# Patient Record
Sex: FEMALE | Race: WHITE | URBAN - METROPOLITAN AREA
[De-identification: names, ages, dates, MRNs, and addresses within clinical notes are randomized per-mention and may not be internally consistent; named-entity substitution may affect disease eponyms.]

---

## 2018-11-09 ENCOUNTER — HOSPITAL ENCOUNTER (OUTPATIENT)
Dept: CT IMAGING | Age: 68
Discharge: HOME | End: 2018-11-11
Payer: MEDICARE

## 2018-11-09 DIAGNOSIS — R93.89 ABNORMAL CT SCAN: ICD-10-CM

## 2018-11-09 PROCEDURE — A9552 F18 FDG: HCPCS | Performed by: INTERNAL MEDICINE

## 2018-11-09 PROCEDURE — 3430000000 HC RX DIAGNOSTIC RADIOPHARMACEUTICAL: Performed by: INTERNAL MEDICINE

## 2018-11-09 PROCEDURE — 78815 PET IMAGE W/CT SKULL-THIGH: CPT

## 2018-11-09 RX ORDER — FLUDEOXYGLUCOSE F 18 200 MCI/ML
15.9 INJECTION, SOLUTION INTRAVENOUS
Status: COMPLETED | OUTPATIENT
Start: 2018-11-09 | End: 2018-11-09

## 2018-11-09 RX ADMIN — FLUDEOXYGLUCOSE F 18 15.9 MILLICURIE: 200 INJECTION, SOLUTION INTRAVENOUS at 10:18

## 2020-08-25 ENCOUNTER — HOSPITAL ENCOUNTER (OUTPATIENT)
Age: 70
End: 2020-08-25
Payer: MEDICARE

## 2020-08-25 VITALS — BODY MASS INDEX: 25.7 KG/M2

## 2020-08-25 DIAGNOSIS — R06.09: Primary | ICD-10-CM

## 2020-08-25 DIAGNOSIS — R06.02: ICD-10-CM

## 2020-08-25 PROCEDURE — 93017 CV STRESS TEST TRACING ONLY: CPT

## 2020-08-25 PROCEDURE — A9500 TC99M SESTAMIBI: HCPCS

## 2020-08-25 PROCEDURE — A4216 STERILE WATER/SALINE, 10 ML: HCPCS

## 2020-08-25 PROCEDURE — 93306 TTE W/DOPPLER COMPLETE: CPT

## 2020-08-25 PROCEDURE — 78452 HT MUSCLE IMAGE SPECT MULT: CPT

## 2024-03-04 ENCOUNTER — HOSPITAL ENCOUNTER (OUTPATIENT)
Age: 74
Discharge: HOME | End: 2024-03-04
Payer: MEDICARE

## 2024-03-04 DIAGNOSIS — R53.83: Primary | ICD-10-CM

## 2024-03-04 DIAGNOSIS — E55.9: ICD-10-CM

## 2024-03-04 LAB
ALANINE AMINOTRANSFER ALT/SGPT: 17 U/L (ref 13–56)
ALBUMIN SERPL-MCNC: 3.6 G/DL (ref 3.2–5)
ALKALINE PHOSPHATASE: 79 U/L (ref 45–117)
ANION GAP: 5 (ref 5–15)
AST(SGOT): 16 U/L (ref 15–37)
BUN SERPL-MCNC: 12 MG/DL (ref 7–18)
BUN/CREAT RATIO: 14.1 RATIO (ref 10–20)
CALCIUM SERPL-MCNC: 9 MG/DL (ref 8.5–10.1)
CARBON DIOXIDE: 29 MMOL/L (ref 21–32)
CHLORIDE: 106 MMOL/L (ref 98–107)
CRP SERPL-MCNC: < 2.9 MG/L (ref 0–3)
DEPRECATED RDW RBC: 45.5 FL (ref 35.1–43.9)
ERYTHROCYTE [DISTWIDTH] IN BLOOD: 13.2 % (ref 11.6–14.6)
EST GLOM FILT RATE - AFR AMER: 84 ML/MIN (ref 60–?)
GLOBULIN: 3.7 G/DL (ref 2.2–4.2)
GLUCOSE: 87 MG/DL (ref 74–106)
HCT VFR BLD AUTO: 42.7 % (ref 37–47)
HGB BLD-MCNC: 14.1 G/DL (ref 12–15)
MCV RBC: 94.1 FL (ref 81–99)
MEAN CORP HGB CONC: 33 G/DL (ref 32–36)
MEAN PLATELET VOL.: 9.8 FL (ref 6.2–12)
PLATELET # BLD: 308 K/MM3 (ref 150–450)
POTASSIUM: 3.7 MMOL/L (ref 3.5–5.1)
RBC # BLD AUTO: 4.54 M/MM3 (ref 4.2–5.4)
VITAMIN B12: 268 PG/ML (ref 211–911)
VITAMIN D,25 HYDROXY: 43.1 NG/ML
WBC # BLD AUTO: 11.8 K/MM3 (ref 4.4–11)

## 2024-03-04 PROCEDURE — 82607 VITAMIN B-12: CPT

## 2024-03-04 PROCEDURE — 85652 RBC SED RATE AUTOMATED: CPT

## 2024-03-04 PROCEDURE — 80053 COMPREHEN METABOLIC PANEL: CPT

## 2024-03-04 PROCEDURE — 82306 VITAMIN D 25 HYDROXY: CPT

## 2024-03-04 PROCEDURE — 86431 RHEUMATOID FACTOR QUANT: CPT

## 2024-03-04 PROCEDURE — 84443 ASSAY THYROID STIM HORMONE: CPT

## 2024-03-04 PROCEDURE — 36415 COLL VENOUS BLD VENIPUNCTURE: CPT

## 2024-03-04 PROCEDURE — 85027 COMPLETE CBC AUTOMATED: CPT

## 2024-03-04 PROCEDURE — 86140 C-REACTIVE PROTEIN: CPT

## 2024-04-05 ENCOUNTER — HOSPITAL ENCOUNTER (OUTPATIENT)
Dept: HOSPITAL 100 - CVS | Age: 74
Discharge: HOME | End: 2024-04-05
Payer: MEDICARE

## 2024-04-05 DIAGNOSIS — I65.23: Primary | ICD-10-CM

## 2024-04-05 PROCEDURE — 93880 EXTRACRANIAL BILAT STUDY: CPT

## 2024-06-20 ENCOUNTER — APPOINTMENT (OUTPATIENT)
Dept: PRIMARY CARE | Facility: CLINIC | Age: 74
End: 2024-06-20
Payer: MEDICARE

## 2024-06-20 VITALS
BODY MASS INDEX: 23.92 KG/M2 | WEIGHT: 130 LBS | SYSTOLIC BLOOD PRESSURE: 112 MMHG | OXYGEN SATURATION: 93 % | HEIGHT: 62 IN | DIASTOLIC BLOOD PRESSURE: 80 MMHG | TEMPERATURE: 97.4 F | HEART RATE: 88 BPM

## 2024-06-20 DIAGNOSIS — J44.1 COPD EXACERBATION (MULTI): ICD-10-CM

## 2024-06-20 DIAGNOSIS — E53.8 B12 NEUROPATHY (MULTI): ICD-10-CM

## 2024-06-20 DIAGNOSIS — F41.9 ANXIETY AND DEPRESSION: ICD-10-CM

## 2024-06-20 DIAGNOSIS — F32.A ANXIETY AND DEPRESSION: ICD-10-CM

## 2024-06-20 DIAGNOSIS — Z76.89 ENCOUNTER TO ESTABLISH CARE WITH NEW DOCTOR: Primary | ICD-10-CM

## 2024-06-20 DIAGNOSIS — G63 B12 NEUROPATHY (MULTI): ICD-10-CM

## 2024-06-20 PROBLEM — Z87.891 FORMER SMOKER: Status: ACTIVE | Noted: 2024-06-20

## 2024-06-20 PROBLEM — R09.02 HYPOXIA: Status: ACTIVE | Noted: 2024-06-20

## 2024-06-20 PROCEDURE — 96372 THER/PROPH/DIAG INJ SC/IM: CPT | Performed by: INTERNAL MEDICINE

## 2024-06-20 PROCEDURE — 1123F ACP DISCUSS/DSCN MKR DOCD: CPT | Performed by: INTERNAL MEDICINE

## 2024-06-20 PROCEDURE — 1159F MED LIST DOCD IN RCRD: CPT | Performed by: INTERNAL MEDICINE

## 2024-06-20 PROCEDURE — 1158F ADVNC CARE PLAN TLK DOCD: CPT | Performed by: INTERNAL MEDICINE

## 2024-06-20 PROCEDURE — 99204 OFFICE O/P NEW MOD 45 MIN: CPT | Performed by: INTERNAL MEDICINE

## 2024-06-20 RX ORDER — ALBUTEROL SULFATE 90 UG/1
2 AEROSOL, METERED RESPIRATORY (INHALATION) EVERY 6 HOURS PRN
COMMUNITY

## 2024-06-20 RX ORDER — LORAZEPAM 0.5 MG/1
0.5 TABLET ORAL DAILY PRN
COMMUNITY
Start: 2024-03-18

## 2024-06-20 RX ORDER — PAROXETINE 10 MG/1
10 TABLET, FILM COATED ORAL EVERY MORNING
COMMUNITY
Start: 2024-04-12 | End: 2024-07-18 | Stop reason: SDUPTHER

## 2024-06-20 RX ORDER — CYANOCOBALAMIN 1000 UG/ML
1000 INJECTION, SOLUTION INTRAMUSCULAR; SUBCUTANEOUS ONCE
Status: COMPLETED | OUTPATIENT
Start: 2024-06-20 | End: 2024-06-20

## 2024-06-20 RX ORDER — CODEINE PHOSPHATE AND GUAIFENESIN 10; 100 MG/5ML; MG/5ML
5 SOLUTION ORAL EVERY 6 HOURS PRN
COMMUNITY
Start: 2024-04-10

## 2024-06-20 RX ORDER — BUDESONIDE AND FORMOTEROL FUMARATE DIHYDRATE 160; 4.5 UG/1; UG/1
2 AEROSOL RESPIRATORY (INHALATION)
COMMUNITY
Start: 2024-04-12

## 2024-06-20 RX ORDER — ALBUTEROL SULFATE 0.83 MG/ML
2.5 SOLUTION RESPIRATORY (INHALATION) EVERY 6 HOURS PRN
COMMUNITY

## 2024-06-20 RX ORDER — METOPROLOL SUCCINATE 25 MG/1
25 TABLET, EXTENDED RELEASE ORAL DAILY
COMMUNITY
Start: 2024-05-30 | End: 2024-07-18 | Stop reason: WASHOUT

## 2024-06-20 ASSESSMENT — PATIENT HEALTH QUESTIONNAIRE - PHQ9
1. LITTLE INTEREST OR PLEASURE IN DOING THINGS: NOT AT ALL
SUM OF ALL RESPONSES TO PHQ9 QUESTIONS 1 AND 2: 0
2. FEELING DOWN, DEPRESSED OR HOPELESS: NOT AT ALL

## 2024-06-20 ASSESSMENT — ENCOUNTER SYMPTOMS
LOSS OF SENSATION IN FEET: 0
DEPRESSION: 0
OCCASIONAL FEELINGS OF UNSTEADINESS: 0

## 2024-07-15 ASSESSMENT — ENCOUNTER SYMPTOMS
CHEST TIGHTNESS: 0
ACTIVITY CHANGE: 0
JOINT SWELLING: 0
STRIDOR: 0
FEVER: 0
LIGHT-HEADEDNESS: 0
BLOOD IN STOOL: 0
EYE REDNESS: 0
HEMATURIA: 0
PALPITATIONS: 0
SPEECH DIFFICULTY: 0

## 2024-07-15 NOTE — PROGRESS NOTES
"CHIEF COMPLAINT:    Chief Complaint   Patient presents with    New Patient Visit     New patient establish PCP. Patient requesting referral to pulmonologist in area, patient on oxygen and has COPD. Also requesting referral for a new Cardiologist, previous physician moved.         HISTORY OF PRESENT ILLNESS:  Anna Holloway  is a pleasant 74 y.o. female Comes here to establish primary care physician.  Patient has COPD and also anxiety and depression.  She is supplemental oxygen dependent.  Overall she is doing well.  She does not have any acute medical complaint at this time.  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  She is to get vitamin B12 injection.  She is interested to have an injection today in the office.  Down the road she would need a referral to the pulmonologist to establish her respiratory care.  She also has hypertension for which she is on metoprolol; currently her blood pressure under good control.  She does not have any acute medical complaint today.      Review of Systems   Constitutional:  Negative for activity change and fever.   HENT:  Negative for hearing loss, nosebleeds and tinnitus.    Eyes:  Negative for redness.   Respiratory:  Negative for chest tightness and stridor.    Cardiovascular:  Negative for chest pain, palpitations and leg swelling.   Gastrointestinal:  Negative for blood in stool.   Endocrine: Negative for cold intolerance.   Genitourinary:  Negative for hematuria.   Musculoskeletal:  Negative for joint swelling.   Skin:  Negative for rash.   Neurological:  Negative for speech difficulty and light-headedness.   Psychiatric/Behavioral:  Negative for behavioral problems.      Visit Vitals  /80 (BP Location: Left arm, Patient Position: Sitting, BP Cuff Size: Adult)   Pulse 88   Temp 36.3 °C (97.4 °F) (Temporal)   Ht 1.575 m (5' 2\")   Wt 59 kg (130 lb)   SpO2 93%   BMI 23.78 kg/m²   Smoking Status Former   BSA 1.61 m²         Wt Readings from Last 10 Encounters:   06/20/24 " 59 kg (130 lb)       Physical Exam  Constitutional:       General: She is not in acute distress.     Appearance: Normal appearance.   HENT:      Head: Normocephalic.      Right Ear: Tympanic membrane normal.      Left Ear: Tympanic membrane normal.      Mouth/Throat:      Mouth: Mucous membranes are moist.   Cardiovascular:      Rate and Rhythm: Normal rate and regular rhythm.      Heart sounds: No murmur heard.  Pulmonary:      Effort: No respiratory distress.   Abdominal:      Palpations: Abdomen is soft.   Musculoskeletal:      Cervical back: Neck supple.      Right lower leg: No edema.      Left lower leg: No edema.   Skin:     Findings: No rash.   Neurological:      General: No focal deficit present.      Mental Status: She is alert and oriented to person, place, and time.   Psychiatric:         Mood and Affect: Mood normal.          RECENT LABS:  Lab Results   Component Value Date    WBC 9.4 06/09/2021    HGB 15.4 06/09/2021    HCT 45.9 06/09/2021     06/09/2021     06/09/2021    K 3.7 06/09/2021    CL 99 06/09/2021    CREATININE 0.61 06/09/2021    BUN 14 06/09/2021    CO2 34 (H) 06/09/2021    INR 0.8 (L) 06/09/2021       IMAGING:  === 06/09/21 ===    XR CHEST 1 VIEW  Hyperinflation. Postoperative changes in the right upper chest.  Curvilinear possible plate atelectasis or scarring at the left lung  base.     === 06/09/21 ===    CT HEAD WO IV CONTRAST  No evidence of acute cortical ischemia or intracranial hemorrhage.        MEDICATIONS:   Current Outpatient Medications   Medication Instructions    albuterol (Proventil HFA) 90 mcg/actuation inhaler 2 puffs, inhalation, Every 6 hours PRN    albuterol 2.5 mg, nebulization, Every 6 hours PRN    codeine-guaifenesin (Robitussin-AC)  mg/5 mL syrup 5 mL, oral, Every 6 hours PRN    cyanocobalamin, vitamin B-12, 1,000 mcg/mL kit 1 mL, injection, Weekly    LORazepam (ATIVAN) 0.5 mg, oral, Daily PRN    metoprolol succinate XL (TOPROL-XL) 25 mg, oral,  Daily    oxygen (O2) 2 L/min, inhalation, Continuous    PARoxetine (PAXIL) 10 mg, oral, Every morning    Symbicort 160-4.5 mcg/actuation inhaler 2 puffs, inhalation, 2 times daily RT        TODAY'S VISIT  DX:   1. Encounter to establish care with new doctor        2. COPD exacerbation (Multi)  Stable for now.  On supplemental oxygen 2 L via nasal cannula      3. Anxiety and depression  Continue on paroxetine      4. B12 neuropathy (Multi)      cyanocobalamin, vitamin B-12, 1,000 mcg/mL kit         MEDICAL DECISION MAKING:  - The current and active medical co morbidities have been considered.   - Recent lab work and relevant imaging studies have been reviewed.    - Relevant correspondence/notes from other specialty consultants were reviewed.    - Next Follow up in 3 months  - Patient was given treatment as per above plan

## 2024-07-18 ENCOUNTER — APPOINTMENT (OUTPATIENT)
Dept: PRIMARY CARE | Facility: CLINIC | Age: 74
End: 2024-07-18
Payer: MEDICARE

## 2024-07-18 VITALS
HEIGHT: 62 IN | WEIGHT: 131.6 LBS | HEART RATE: 120 BPM | OXYGEN SATURATION: 96 % | SYSTOLIC BLOOD PRESSURE: 138 MMHG | BODY MASS INDEX: 24.22 KG/M2 | TEMPERATURE: 98 F | DIASTOLIC BLOOD PRESSURE: 80 MMHG

## 2024-07-18 DIAGNOSIS — E53.8 B12 NEUROPATHY (MULTI): ICD-10-CM

## 2024-07-18 DIAGNOSIS — I42.0 DILATED CARDIOMYOPATHY (MULTI): ICD-10-CM

## 2024-07-18 DIAGNOSIS — G63 B12 NEUROPATHY (MULTI): ICD-10-CM

## 2024-07-18 DIAGNOSIS — F41.9 ANXIETY AND DEPRESSION: Primary | ICD-10-CM

## 2024-07-18 DIAGNOSIS — R00.0 TACHYCARDIA: ICD-10-CM

## 2024-07-18 DIAGNOSIS — J44.1 COPD EXACERBATION (MULTI): ICD-10-CM

## 2024-07-18 DIAGNOSIS — F32.A ANXIETY AND DEPRESSION: Primary | ICD-10-CM

## 2024-07-18 PROCEDURE — 1123F ACP DISCUSS/DSCN MKR DOCD: CPT | Performed by: INTERNAL MEDICINE

## 2024-07-18 PROCEDURE — 3008F BODY MASS INDEX DOCD: CPT | Performed by: INTERNAL MEDICINE

## 2024-07-18 PROCEDURE — 99214 OFFICE O/P EST MOD 30 MIN: CPT | Performed by: INTERNAL MEDICINE

## 2024-07-18 PROCEDURE — 1159F MED LIST DOCD IN RCRD: CPT | Performed by: INTERNAL MEDICINE

## 2024-07-18 RX ORDER — PAROXETINE HYDROCHLORIDE 20 MG/1
20 TABLET, FILM COATED ORAL EVERY MORNING
Start: 2024-07-18 | End: 2025-07-18

## 2024-07-18 RX ORDER — ATENOLOL 25 MG/1
25 TABLET ORAL DAILY
Qty: 30 TABLET | Refills: 1 | Status: SHIPPED | OUTPATIENT
Start: 2024-07-18 | End: 2025-01-14

## 2024-07-18 ASSESSMENT — PATIENT HEALTH QUESTIONNAIRE - PHQ9
1. LITTLE INTEREST OR PLEASURE IN DOING THINGS: NOT AT ALL
2. FEELING DOWN, DEPRESSED OR HOPELESS: NOT AT ALL
SUM OF ALL RESPONSES TO PHQ9 QUESTIONS 1 AND 2: 0

## 2024-07-18 ASSESSMENT — ENCOUNTER SYMPTOMS: DEPRESSION: 0

## 2024-07-19 RX ORDER — CYANOCOBALAMIN 1000 UG/ML
1000 INJECTION, SOLUTION INTRAMUSCULAR; SUBCUTANEOUS ONCE
Status: SHIPPED | OUTPATIENT
Start: 2024-07-19

## 2024-08-13 DIAGNOSIS — Z12.31 ENCOUNTER FOR SCREENING MAMMOGRAM FOR BREAST CANCER: ICD-10-CM

## 2024-08-13 ASSESSMENT — ENCOUNTER SYMPTOMS
ACTIVITY CHANGE: 0
PALPITATIONS: 0
FEVER: 0
HEMATURIA: 0
STRIDOR: 0
CHEST TIGHTNESS: 0
JOINT SWELLING: 0
SPEECH DIFFICULTY: 0
BLOOD IN STOOL: 0
LIGHT-HEADEDNESS: 0
EYE REDNESS: 0

## 2024-08-14 NOTE — PROGRESS NOTES
Anna Holloway, edith 74 y.o. female was seen today:   Chief Complaint   Patient presents with    Follow-up     Patient here for one month follow-up. Patient stated that she has weaned off of Metoprolol. C/o increased anxiety today.        VISIT [place]:  Here for 1 month follow-up.  During this time patient was doing well.  She gradually took herself off of metoprolol.  This is giving her somewhat anxiety.  Metoprolol was causing her bronchospasm.  She does have heart palpitation now.  Is hard to say whether it is cardiac in nature or secondary to anxiety.  Will start her on paroxetine.  Patient will go to pulmonology for her COPD.  She has dilated cardiomyopathy for which she would benefit from a transthoracic echocardiogram.  Patient will set up an appointment with cardiology.  She does not have any chest pain or pressure at this time.        TODAY'S VISIT  DX:     1. Anxiety and depression  PARoxetine (Paxil) 20 mg tablet      2. Dilated cardiomyopathy (Multi)  Transthoracic Echo Complete      3. COPD exacerbation (Multi)  Referral to Pulmonology      4. Tachycardia  atenolol (Tenormin) 25 mg tablet      5. B12 neuropathy (Multi)  cyanocobalamin (Vitamin B-12) injection 1,000 mcg           MEDICAL DECISION MAKING:  - The current and active medical co morbidities have been considered.   - Recent lab work and relevant imaging studies have been reviewed.    - Relevant correspondence/notes from other specialty consultants were reviewed.    - Medication have been sent for refill.    - Therapy and treatment as per above plan   - Next Follow up in 3 months      Review of Systems   Constitutional:  Negative for activity change and fever.   HENT:  Negative for hearing loss, nosebleeds and tinnitus.    Eyes:  Negative for redness.   Respiratory:  Negative for chest tightness and stridor.    Cardiovascular:  Negative for chest pain, palpitations and leg swelling.   Gastrointestinal:  Negative for blood in stool.  "  Endocrine: Negative for cold intolerance.   Genitourinary:  Negative for hematuria.   Musculoskeletal:  Negative for joint swelling.   Skin:  Negative for rash.   Neurological:  Negative for speech difficulty and light-headedness.   Psychiatric/Behavioral:  Negative for behavioral problems.      Visit Vitals  /80 (BP Location: Left arm, Patient Position: Sitting, BP Cuff Size: Adult)   Pulse (!) 120   Temp 36.7 °C (98 °F) (Temporal)   Ht 1.575 m (5' 2\")   Wt 59.7 kg (131 lb 9.6 oz)   SpO2 96%   BMI 24.07 kg/m²   Smoking Status Former   BSA 1.62 m²         Wt Readings from Last 10 Encounters:   07/18/24 59.7 kg (131 lb 9.6 oz)   06/20/24 59 kg (130 lb)     Physical Exam  Constitutional:       General: She is not in acute distress.     Appearance: Normal appearance.   HENT:      Head: Normocephalic.      Right Ear: Tympanic membrane normal.      Left Ear: Tympanic membrane normal.      Mouth/Throat:      Mouth: Mucous membranes are moist.   Cardiovascular:      Rate and Rhythm: Normal rate and regular rhythm.      Heart sounds: No murmur heard.  Pulmonary:      Effort: No respiratory distress.   Abdominal:      Palpations: Abdomen is soft.   Musculoskeletal:      Cervical back: Neck supple.      Right lower leg: No edema.      Left lower leg: No edema.   Skin:     Findings: No rash.   Neurological:      General: No focal deficit present.      Mental Status: She is alert and oriented to person, place, and time.   Psychiatric:         Mood and Affect: Mood normal.          RECENT LABS:  Lab Results   Component Value Date    WBC 9.4 06/09/2021    HGB 15.4 06/09/2021    HCT 45.9 06/09/2021     06/09/2021     06/09/2021    K 3.7 06/09/2021    CL 99 06/09/2021    CREATININE 0.61 06/09/2021    BUN 14 06/09/2021    CO2 34 (H) 06/09/2021    INR 0.8 (L) 06/09/2021     Lab Results   Component Value Date    GLUCOSE 107 (H) 06/09/2021    CALCIUM 9.0 06/09/2021     06/09/2021    K 3.7 06/09/2021    CO2 34 " (H) 06/09/2021    CL 99 06/09/2021    BUN 14 06/09/2021    CREATININE 0.61 06/09/2021      IMAGING:  === 06/09/21 ===  XR CHEST 1 VIEW  Hyperinflation. Postoperative changes in the right upper chest.  Curvilinear possible plate atelectasis or scarring at the left lung  base.     === 06/09/21 ===  CT HEAD WO IV CONTRAST  No evidence of acute cortical ischemia or intracranial hemorrhage.        MEDICATIONS:   Current Outpatient Medications   Medication Instructions    albuterol (Proventil HFA) 90 mcg/actuation inhaler 2 puffs, inhalation, Every 6 hours PRN    albuterol 2.5 mg, nebulization, Every 6 hours PRN    atenolol (TENORMIN) 25 mg, oral, Daily    codeine-guaifenesin (Robitussin-AC)  mg/5 mL syrup 5 mL, oral, Every 6 hours PRN    cyanocobalamin, vitamin B-12, 1,000 mcg/mL kit 1 mL, injection, Weekly    LORazepam (ATIVAN) 0.5 mg, oral, Daily PRN    oxygen (O2) 2 L/min, inhalation, Continuous    PARoxetine (PAXIL) 20 mg, oral, Every morning    Symbicort 160-4.5 mcg/actuation inhaler 2 puffs, inhalation, 2 times daily RT

## 2024-08-15 ENCOUNTER — APPOINTMENT (OUTPATIENT)
Dept: PRIMARY CARE | Facility: CLINIC | Age: 74
End: 2024-08-15
Payer: MEDICARE

## 2024-08-16 ENCOUNTER — APPOINTMENT (OUTPATIENT)
Dept: CARDIOLOGY | Facility: HOSPITAL | Age: 74
End: 2024-08-16
Payer: MEDICARE

## 2024-09-04 ENCOUNTER — APPOINTMENT (OUTPATIENT)
Dept: CARDIOLOGY | Facility: HOSPITAL | Age: 74
End: 2024-09-04
Payer: MEDICARE

## 2024-09-04 DIAGNOSIS — R00.0 TACHYCARDIA: ICD-10-CM

## 2024-09-04 RX ORDER — ATENOLOL 25 MG/1
25 TABLET ORAL DAILY
Qty: 90 TABLET | Refills: 1 | Status: SHIPPED | OUTPATIENT
Start: 2024-09-04 | End: 2025-09-04

## 2024-09-06 ENCOUNTER — APPOINTMENT (OUTPATIENT)
Dept: PRIMARY CARE | Facility: CLINIC | Age: 74
End: 2024-09-06
Payer: MEDICARE